# Patient Record
Sex: MALE | Race: BLACK OR AFRICAN AMERICAN | Employment: FULL TIME | URBAN - NONMETROPOLITAN AREA
[De-identification: names, ages, dates, MRNs, and addresses within clinical notes are randomized per-mention and may not be internally consistent; named-entity substitution may affect disease eponyms.]

---

## 2021-08-23 ENCOUNTER — HOSPITAL ENCOUNTER (EMERGENCY)
Age: 21
Discharge: HOME | End: 2021-08-23
Attending: FAMILY MEDICINE
Payer: MEDICAID

## 2021-08-23 VITALS
WEIGHT: 140 LBS | BODY MASS INDEX: 23.32 KG/M2 | HEART RATE: 73 BPM | OXYGEN SATURATION: 100 % | RESPIRATION RATE: 18 BRPM | TEMPERATURE: 98.8 F | HEIGHT: 65 IN | SYSTOLIC BLOOD PRESSURE: 135 MMHG | DIASTOLIC BLOOD PRESSURE: 75 MMHG

## 2021-08-23 DIAGNOSIS — J20.9 ACUTE BRONCHITIS, UNSPECIFIED ORGANISM: Primary | ICD-10-CM

## 2021-08-23 PROCEDURE — 99282 EMERGENCY DEPT VISIT SF MDM: CPT

## 2021-08-23 RX ORDER — BENZONATATE 100 MG/1
100 CAPSULE ORAL
Qty: 21 CAPSULE | Refills: 0 | Status: SHIPPED | OUTPATIENT
Start: 2021-08-23 | End: 2021-08-30

## 2021-08-23 RX ORDER — AZITHROMYCIN 250 MG/1
TABLET, FILM COATED ORAL
Qty: 6 TABLET | Refills: 0 | Status: SHIPPED | OUTPATIENT
Start: 2021-08-23 | End: 2021-08-28

## 2021-08-23 NOTE — ED PROVIDER NOTES
EMERGENCY DEPARTMENT HISTORY AND PHYSICAL EXAM      Date: 8/23/2021  Patient Name: Rosa M Dinh    History of Presenting Illness     Chief Complaint   Patient presents with    Wheezing    Cough       History Provided By: Patient    HPI: Rosa M Dinh, 23 y.o. male with a past medical history significant No significant past medical history presents to the ED with cc of productive cough for the past week. He complains of some chest tightness. He denies any wheezing. He denies fevers or chills. He denies headache and body ache. There are no other complaints, changes, or physical findings at this time. PCP: No primary care provider on file. No current facility-administered medications on file prior to encounter. No current outpatient medications on file prior to encounter. Past History     Past Medical History:  History reviewed. No pertinent past medical history. Past Surgical History:  History reviewed. No pertinent surgical history. Family History:  History reviewed. No pertinent family history. Social History:  Social History     Tobacco Use    Smoking status: Current Every Day Smoker     Packs/day: 0.25    Smokeless tobacco: Current User   Substance Use Topics    Alcohol use: Yes     Comment: occas     Drug use: Never       Allergies:  No Known Allergies      Review of Systems     Review of Systems   Constitutional: Negative for fatigue and fever. HENT: Negative for rhinorrhea and sore throat. Respiratory: Positive for cough. Negative for shortness of breath. Cardiovascular: Negative for chest pain and palpitations. Gastrointestinal: Negative for abdominal pain, diarrhea, nausea and vomiting. Genitourinary: Negative for difficulty urinating and dysuria. Musculoskeletal: Negative for arthralgias and myalgias. Skin: Negative for color change and rash. Neurological: Negative for light-headedness and headaches.        Physical Exam     Physical Exam  Vitals and nursing note reviewed. Constitutional:       General: He is awake. He is not in acute distress. Appearance: Normal appearance. He is well-developed and normal weight. He is not ill-appearing, toxic-appearing or diaphoretic. Interventions: Face mask in place. HENT:      Head: Normocephalic and atraumatic. Eyes:      Conjunctiva/sclera: Conjunctivae normal.      Pupils: Pupils are equal, round, and reactive to light. Cardiovascular:      Rate and Rhythm: Normal rate and regular rhythm. Pulses: Normal pulses. Heart sounds: Normal heart sounds. Pulmonary:      Effort: Pulmonary effort is normal.      Breath sounds: Normal breath sounds. Abdominal:      Tenderness: There is no abdominal tenderness. Musculoskeletal:         General: Normal range of motion. Cervical back: Normal range of motion and neck supple. Skin:     General: Skin is warm and dry. Neurological:      General: No focal deficit present. Mental Status: He is alert and oriented to person, place, and time. GCS: GCS eye subscore is 4. GCS verbal subscore is 5. GCS motor subscore is 6. Psychiatric:         Mood and Affect: Mood and affect normal.         Behavior: Behavior normal. Behavior is cooperative. Thought Content: Thought content normal.         Lab and Diagnostic Study Results     Labs -   No results found for this or any previous visit (from the past 12 hour(s)). Radiologic Studies -   @lastxrresult@  CT Results  (Last 48 hours)    None        CXR Results  (Last 48 hours)    None            Medical Decision Making   - I am the first provider for this patient. - I reviewed the vital signs, available nursing notes, past medical history, past surgical history, family history and social history. - Initial assessment performed. The patients presenting problems have been discussed, and they are in agreement with the care plan formulated and outlined with them.   I have encouraged them to ask questions as they arise throughout their visit. Vital Signs-Reviewed the patient's vital signs. Patient Vitals for the past 12 hrs:   Temp Pulse Resp BP SpO2   08/23/21 1746     100 %   08/23/21 1742 98.8 °F (37.1 °C) 73 18 135/75 100 %       Records Reviewed: Nursing Notes          ED Course:          Provider Notes (Medical Decision Making):     Patient presented with productive cough for a week. He is afebrile. His O2 sats are stable. His symptoms are atypical for Covid so this was not tested for. He had no wheezing. He is can be started on a Z-Porfirio. MDM       Procedures   Medical Decision Makingedical Decision Making  Performed by: Abiola Barron MD  PROCEDURES:  Procedures       Disposition   Disposition:     Discharged    DISCHARGE PLAN:  1. Current Discharge Medication List      START taking these medications    Details   azithromycin (ZITHROMAX) 250 mg tablet Take as directed  Qty: 6 Tablet, Refills: 0      benzonatate (Tessalon Perles) 100 mg capsule Take 1 Capsule by mouth three (3) times daily as needed for Cough for up to 7 days. Qty: 21 Capsule, Refills: 0           2. Follow-up Information     Follow up With Specialties Details Why Alejandro Null MD Family Medicine  As needed [place] [id number]  [phone]          3. Return to ED if worse   4. Current Discharge Medication List      START taking these medications    Details   azithromycin (ZITHROMAX) 250 mg tablet Take as directed  Qty: 6 Tablet, Refills: 0  Start date: 8/23/2021, End date: 8/28/2021      benzonatate (Tessalon Perles) 100 mg capsule Take 1 Capsule by mouth three (3) times daily as needed for Cough for up to 7 days. Qty: 21 Capsule, Refills: 0  Start date: 8/23/2021, End date: 8/30/2021               Diagnosis     Clinical Impression:   1.  Acute bronchitis, unspecified organism        Attestations:    Abiola Barron MD    Please note that this dictation was completed with Dragon, the computer voice recognition software. Quite often unanticipated grammatical, syntax, homophones, and other interpretive errors are inadvertently transcribed by the computer software. Please disregard these errors. Please excuse any errors that have escaped final proofreading. Thank you.

## 2021-08-23 NOTE — LETTER
Roxi العراقي was seen and treated in our emergency department on 8/23/2021. He may return to work on 08/25/2021    If you have any questions or concerns, please don't hesitate to call.

## 2021-09-23 ENCOUNTER — HOSPITAL ENCOUNTER (EMERGENCY)
Age: 21
Discharge: HOME | End: 2021-09-23
Attending: FAMILY MEDICINE
Payer: MEDICAID

## 2021-09-23 VITALS
BODY MASS INDEX: 21.97 KG/M2 | HEIGHT: 67 IN | WEIGHT: 140 LBS | TEMPERATURE: 99.1 F | DIASTOLIC BLOOD PRESSURE: 63 MMHG | RESPIRATION RATE: 18 BRPM | HEART RATE: 60 BPM | SYSTOLIC BLOOD PRESSURE: 116 MMHG

## 2021-09-23 DIAGNOSIS — Z20.2 STD EXPOSURE: Primary | ICD-10-CM

## 2021-09-23 PROCEDURE — 99282 EMERGENCY DEPT VISIT SF MDM: CPT

## 2021-09-23 RX ORDER — METRONIDAZOLE 500 MG/1
2000 TABLET ORAL
Qty: 4 TABLET | Refills: 0 | Status: SHIPPED | OUTPATIENT
Start: 2021-09-23 | End: 2021-09-23

## 2021-09-23 RX ORDER — DOXYCYCLINE HYCLATE 100 MG
100 TABLET ORAL 2 TIMES DAILY
Qty: 14 TABLET | Refills: 0 | Status: SHIPPED | OUTPATIENT
Start: 2021-09-23 | End: 2021-09-30

## 2021-09-23 NOTE — ED PROVIDER NOTES
EMERGENCY DEPARTMENT HISTORY AND PHYSICAL EXAM      Date: 9/23/2021  Patient Name: Tricia Grubbs    History of Presenting Illness     Chief Complaint   Patient presents with    Exposure to STD         History Provided By: Patient    HPI: Tricia Grubbs, 23 y.o. male with a past medical history significant No significant past medical history presents to the ED with cc of Deejay to a STD. Patient states that he was told that he has been exposed to trichomonas. He is not having any symptoms other than sore throat. He denies any fevers or chills. He denies penile discharge. There are no other complaints, changes, or physical findings at this time. PCP: No primary care provider on file. No current facility-administered medications on file prior to encounter. No current outpatient medications on file prior to encounter. Past History     Past Medical History:  History reviewed. No pertinent past medical history. Past Surgical History:  History reviewed. No pertinent surgical history. Family History:  History reviewed. No pertinent family history. Social History:  Social History     Tobacco Use    Smoking status: Former Smoker    Smokeless tobacco: Former User   Substance Use Topics    Alcohol use: Yes     Alcohol/week: 4.0 standard drinks     Types: 4 Cans of beer per week     Comment: occassionally    Drug use: Not on file       Allergies:  No Known Allergies      Review of Systems     Review of Systems   Constitutional: Negative for chills and fever. HENT: Positive for sore throat. Genitourinary: Negative for discharge, dysuria, enuresis, penile pain, penile swelling and scrotal swelling. Physical Exam     Physical Exam  Vitals and nursing note reviewed. Constitutional:       General: He is awake. He is not in acute distress. Appearance: Normal appearance. He is well-developed and normal weight. He is not ill-appearing, toxic-appearing or diaphoretic. Interventions: Face mask in place. HENT:      Head: Normocephalic and atraumatic. Eyes:      Conjunctiva/sclera: Conjunctivae normal.      Pupils: Pupils are equal, round, and reactive to light. Cardiovascular:      Rate and Rhythm: Normal rate and regular rhythm. Pulses: Normal pulses. Heart sounds: Normal heart sounds. Pulmonary:      Effort: Pulmonary effort is normal.      Breath sounds: Normal breath sounds. Abdominal:      General: Abdomen is flat. Palpations: Abdomen is soft. Tenderness: There is no abdominal tenderness. Musculoskeletal:      Cervical back: Normal range of motion and neck supple. Skin:     General: Skin is warm and dry. Neurological:      General: No focal deficit present. Mental Status: He is alert and oriented to person, place, and time. GCS: GCS eye subscore is 4. GCS verbal subscore is 5. GCS motor subscore is 6. Psychiatric:         Mood and Affect: Mood and affect normal.         Behavior: Behavior normal. Behavior is cooperative. Thought Content: Thought content normal.         Lab and Diagnostic Study Results     Labs -   No results found for this or any previous visit (from the past 12 hour(s)). Radiologic Studies -   @lastxrresult@  CT Results  (Last 48 hours)    None        CXR Results  (Last 48 hours)    None            Medical Decision Making   - I am the first provider for this patient. - I reviewed the vital signs, available nursing notes, past medical history, past surgical history, family history and social history. - Initial assessment performed. The patients presenting problems have been discussed, and they are in agreement with the care plan formulated and outlined with them. I have encouraged them to ask questions as they arise throughout their visit. Vital Signs-Reviewed the patient's vital signs.   Patient Vitals for the past 12 hrs:   Temp Pulse Resp BP   09/23/21 1441 99.1 °F (37.3 °C) 60 18 116/63 Records Reviewed: Nursing Notes          ED Course:          Provider Notes (Medical Decision Making): The patient was exposed to trichomonas. He is can be tested for gonorrhea and chlamydia. He will be prophylactically treated. MDM       Procedures   Medical Decision Makingedical Decision Making  Performed by: King Bullock MD  PROCEDURES:  Procedures       Disposition   Disposition:     Discharged    DISCHARGE PLAN:  1. Current Discharge Medication List      START taking these medications    Details   doxycycline (VIBRA-TABS) 100 mg tablet Take 1 Tablet by mouth two (2) times a day for 7 days. Qty: 14 Tablet, Refills: 0      metroNIDAZOLE (FlagyL) 500 mg tablet Take 4 Tablets by mouth now for 1 dose. Take all 4 pills one time. Qty: 4 Tablet, Refills: 0           2. Follow-up Information     Follow up With Specialties Details Why Tanna Coleman MD Internal Medicine  As needed [address] [place] [id number]  [phone]          3. Return to ED if worse   4. Current Discharge Medication List      START taking these medications    Details   doxycycline (VIBRA-TABS) 100 mg tablet Take 1 Tablet by mouth two (2) times a day for 7 days. Qty: 14 Tablet, Refills: 0  Start date: 9/23/2021, End date: 9/30/2021      metroNIDAZOLE (FlagyL) 500 mg tablet Take 4 Tablets by mouth now for 1 dose. Take all 4 pills one time. Qty: 4 Tablet, Refills: 0  Start date: 9/23/2021, End date: 9/23/2021               Diagnosis     Clinical Impression:   1. STD exposure        Attestations:    King Bullock MD    Please note that this dictation was completed with [employer], the computer voice recognition software. Quite often unanticipated grammatical, syntax, homophones, and other interpretive errors are inadvertently transcribed by the computer software. Please disregard these errors. Please excuse any errors that have escaped final proofreading. Thank you.